# Patient Record
Sex: FEMALE | URBAN - METROPOLITAN AREA
[De-identification: names, ages, dates, MRNs, and addresses within clinical notes are randomized per-mention and may not be internally consistent; named-entity substitution may affect disease eponyms.]

---

## 2024-09-03 ENCOUNTER — DOCUMENTATION (OUTPATIENT)
Dept: PRIMARY CARE | Facility: CLINIC | Age: 54
End: 2024-09-03

## 2024-09-03 NOTE — PROGRESS NOTES
Patients daughter called severe recurrent abdominal pain [facility] reported 1 month ago diverticulitis, send to ER now needs stat CT abd eval then follow up with dr gonzalez within 1 week office